# Patient Record
Sex: FEMALE | Race: WHITE | NOT HISPANIC OR LATINO | Employment: UNEMPLOYED | URBAN - METROPOLITAN AREA
[De-identification: names, ages, dates, MRNs, and addresses within clinical notes are randomized per-mention and may not be internally consistent; named-entity substitution may affect disease eponyms.]

---

## 2023-11-07 PROBLEM — R46.89 UNCOOPERATIVE BEHAVIOR: Status: ACTIVE | Noted: 2023-11-07

## 2023-11-07 PROBLEM — R45.1 AGITATION: Status: ACTIVE | Noted: 2023-11-07

## 2023-11-08 PROBLEM — F31.12 BIPOLAR 1 DISORDER, MANIC, MODERATE: Status: ACTIVE | Noted: 2023-11-08

## 2023-11-08 PROBLEM — F15.90 STIMULANT USE DISORDER: Status: ACTIVE | Noted: 2023-11-08

## 2023-11-08 PROBLEM — F19.950 SUBSTANCE-INDUCED PSYCHOTIC DISORDER WITH DELUSIONS: Status: ACTIVE | Noted: 2023-11-08

## 2023-11-08 PROBLEM — E89.0 POSTABLATIVE HYPOTHYROIDISM: Status: ACTIVE | Noted: 2023-11-08

## 2023-11-08 PROBLEM — I48.91 ATRIAL FIBRILLATION: Status: ACTIVE | Noted: 2023-11-08

## 2023-11-29 PROBLEM — F90.9 ADHD: Status: ACTIVE | Noted: 2023-11-29

## 2023-11-30 PROBLEM — G31.84 COGNITIVE IMPAIRMENT, MILD, SO STATED: Status: ACTIVE | Noted: 2023-11-30

## 2024-02-22 PROBLEM — F31.4 BIPOLAR I DISORDER, MOST RECENT EPISODE DEPRESSED, SEVERE WITHOUT PSYCHOTIC FEATURES: Status: ACTIVE | Noted: 2024-02-22

## 2024-02-23 PROBLEM — F25.0 SCHIZOAFFECTIVE DISORDER, BIPOLAR TYPE: Status: ACTIVE | Noted: 2024-02-23

## 2024-02-23 PROBLEM — F31.4 BIPOLAR I DISORDER, MOST RECENT EPISODE DEPRESSED, SEVERE WITHOUT PSYCHOTIC FEATURES: Status: RESOLVED | Noted: 2024-02-22 | Resolved: 2024-02-23

## 2024-03-12 PROBLEM — R46.89 UNCOOPERATIVE BEHAVIOR: Status: RESOLVED | Noted: 2023-11-07 | Resolved: 2024-03-12

## 2024-03-12 PROBLEM — R45.1 AGITATION: Status: RESOLVED | Noted: 2023-11-07 | Resolved: 2024-03-12

## 2024-03-28 ENCOUNTER — TELEPHONE (OUTPATIENT)
Dept: ADMINISTRATIVE | Facility: HOSPITAL | Age: 64
End: 2024-03-28

## 2024-03-28 NOTE — TELEPHONE ENCOUNTER
3/28/24 Dr. Hunter sent me a message to put pt down for mmg on 4/5/24. There was no answer on her phone so I left her a message that I was going to put her down for 1 pm and for her to call me if that was not good for her. PP

## 2024-04-09 PROBLEM — F31.12 BIPOLAR 1 DISORDER, MANIC, MODERATE: Status: RESOLVED | Noted: 2023-11-08 | Resolved: 2024-04-09

## 2024-04-09 PROBLEM — F19.950 SUBSTANCE-INDUCED PSYCHOTIC DISORDER WITH DELUSIONS: Status: RESOLVED | Noted: 2023-11-08 | Resolved: 2024-04-09

## 2024-04-25 ENCOUNTER — PATIENT MESSAGE (OUTPATIENT)
Dept: URGENT CARE | Facility: CLINIC | Age: 64
End: 2024-04-25

## 2024-06-12 ENCOUNTER — PATIENT OUTREACH (OUTPATIENT)
Dept: ADMINISTRATIVE | Facility: HOSPITAL | Age: 64
End: 2024-06-12

## 2024-06-26 ENCOUNTER — PATIENT OUTREACH (OUTPATIENT)
Dept: ADMINISTRATIVE | Facility: HOSPITAL | Age: 64
End: 2024-06-26

## 2024-07-02 ENCOUNTER — PATIENT OUTREACH (OUTPATIENT)
Dept: ADMINISTRATIVE | Facility: HOSPITAL | Age: 64
End: 2024-07-02

## 2024-07-05 ENCOUNTER — TELEPHONE (OUTPATIENT)
Dept: ADMINISTRATIVE | Facility: HOSPITAL | Age: 64
End: 2024-07-05

## 2024-07-05 NOTE — TELEPHONE ENCOUNTER
7/5/24  Patient returned my call. We set her up for [facility] on 7/26/24 at 10:30 am at [facility] in [facility].  PP

## 2024-07-05 NOTE — TELEPHONE ENCOUNTER
----- Message from Homer Cartwright LPN sent at 6/27/2024  1:37 PM CDT -----    ----- Message -----  From: Nathalia Jackson  Sent: 6/27/2024  10:17 AM CDT  To: [facility] Support Staff    Pt called requesting to speak with Minerva Hernández due to a voicemail being left from her. Ms. Burkett states it's regarding a Mammo if not mistaken. Please give pt a call at [phone]

## 2024-07-25 ENCOUNTER — PATIENT MESSAGE (OUTPATIENT)
Dept: ADMINISTRATIVE | Facility: HOSPITAL | Age: 64
End: 2024-07-25

## 2024-07-25 DIAGNOSIS — G43.909 MIGRAINE WITHOUT STATUS MIGRAINOSUS, NOT INTRACTABLE, UNSPECIFIED MIGRAINE TYPE: ICD-10-CM

## 2024-07-25 RX ORDER — TOPIRAMATE 50 MG/1
50 TABLET, FILM COATED ORAL
Qty: 60 TABLET | Refills: 0 | Status: SHIPPED | OUTPATIENT
Start: 2024-07-25 | End: 2024-09-23

## 2024-08-18 DIAGNOSIS — G43.909 MIGRAINE WITHOUT STATUS MIGRAINOSUS, NOT INTRACTABLE, UNSPECIFIED MIGRAINE TYPE: ICD-10-CM

## 2024-08-20 RX ORDER — TOPIRAMATE 50 MG/1
50 TABLET, FILM COATED ORAL
Qty: 60 TABLET | Refills: 0 | Status: SHIPPED | OUTPATIENT
Start: 2024-08-20 | End: 2024-10-19